# Patient Record
(demographics unavailable — no encounter records)

---
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
## 2025-01-27 NOTE — PLAN
[FreeTextEntry2] : 1. Continue full time use of CI. 2. Annual map/evaluations, sooner if needed. 3. Continued otologic management.

## 2025-01-27 NOTE — ASSESSMENT
[FreeTextEntry1] : EQUIPMENT  Left CI : Cochlear Internal: [id number] Implant date: 2000 Surgeon: Dr. Hannah at [facility] Processor type: [id number], [id number] Serial numbers: [id number] Magnet strength/color: x2/brown Dataloggin.2 hours/day Retention: medium snugfit  MAPPING- N8 Left CI Impedances: WNL all electrodes P1: #10- Counted Ts (-10 CUs for tolerance purposes), Cs in live speech- swept Cs from Oct 2024 Volume/sensitivity:  Processing strategy: ACE Parameters: 900 Hz, 25 PW, 8 maxima  Converted old best N7 map #7 to use with N8. Patient reported comfort with new P1.  Reviewed use and care of N8 with patient and his son. Did not set up TV streamer- patient's son will at home if interested in using. Patient does not use phone or tablet.   Patient and his son inquired about technology for the right ear. Explained traditional hearing aid can be tried, pending medical clearance. Based on otologic history (meningitis, long-standing unaided hearing loss for the right ear), limited benefit may be expected from right CI.